# Patient Record
Sex: FEMALE | Race: OTHER | HISPANIC OR LATINO | URBAN - METROPOLITAN AREA
[De-identification: names, ages, dates, MRNs, and addresses within clinical notes are randomized per-mention and may not be internally consistent; named-entity substitution may affect disease eponyms.]

---

## 2022-04-01 ENCOUNTER — EMERGENCY (EMERGENCY)
Facility: HOSPITAL | Age: 72
LOS: 1 days | Discharge: HOME | End: 2022-04-01
Attending: EMERGENCY MEDICINE | Admitting: EMERGENCY MEDICINE
Payer: MEDICARE

## 2022-04-01 VITALS
DIASTOLIC BLOOD PRESSURE: 77 MMHG | OXYGEN SATURATION: 97 % | SYSTOLIC BLOOD PRESSURE: 144 MMHG | TEMPERATURE: 98 F | HEART RATE: 87 BPM | WEIGHT: 212.08 LBS | RESPIRATION RATE: 18 BRPM

## 2022-04-01 VITALS
RESPIRATION RATE: 17 BRPM | HEART RATE: 88 BPM | DIASTOLIC BLOOD PRESSURE: 81 MMHG | OXYGEN SATURATION: 98 % | SYSTOLIC BLOOD PRESSURE: 140 MMHG

## 2022-04-01 DIAGNOSIS — E78.5 HYPERLIPIDEMIA, UNSPECIFIED: ICD-10-CM

## 2022-04-01 DIAGNOSIS — Z91.013 ALLERGY TO SEAFOOD: ICD-10-CM

## 2022-04-01 DIAGNOSIS — R51.9 HEADACHE, UNSPECIFIED: ICD-10-CM

## 2022-04-01 DIAGNOSIS — J45.909 UNSPECIFIED ASTHMA, UNCOMPLICATED: ICD-10-CM

## 2022-04-01 DIAGNOSIS — I10 ESSENTIAL (PRIMARY) HYPERTENSION: ICD-10-CM

## 2022-04-01 LAB
APPEARANCE UR: CLEAR — SIGNIFICANT CHANGE UP
BILIRUB UR-MCNC: NEGATIVE — SIGNIFICANT CHANGE UP
COLOR SPEC: YELLOW — SIGNIFICANT CHANGE UP
DIFF PNL FLD: NEGATIVE — SIGNIFICANT CHANGE UP
GLUCOSE UR QL: NEGATIVE — SIGNIFICANT CHANGE UP
KETONES UR-MCNC: NEGATIVE — SIGNIFICANT CHANGE UP
LEUKOCYTE ESTERASE UR-ACNC: NEGATIVE — SIGNIFICANT CHANGE UP
NITRITE UR-MCNC: NEGATIVE — SIGNIFICANT CHANGE UP
PH UR: 6 — SIGNIFICANT CHANGE UP (ref 5–8)
PROT UR-MCNC: NEGATIVE MG/DL — SIGNIFICANT CHANGE UP
SP GR SPEC: 1.02 — SIGNIFICANT CHANGE UP (ref 1–1.03)
UROBILINOGEN FLD QL: 0.2 E.U./DL — SIGNIFICANT CHANGE UP

## 2022-04-01 PROCEDURE — 81003 URINALYSIS AUTO W/O SCOPE: CPT

## 2022-04-01 PROCEDURE — 70450 CT HEAD/BRAIN W/O DYE: CPT | Mod: 26,MA

## 2022-04-01 PROCEDURE — 70450 CT HEAD/BRAIN W/O DYE: CPT | Mod: MA

## 2022-04-01 PROCEDURE — 93010 ELECTROCARDIOGRAM REPORT: CPT

## 2022-04-01 PROCEDURE — 99284 EMERGENCY DEPT VISIT MOD MDM: CPT | Mod: 25

## 2022-04-01 PROCEDURE — 93005 ELECTROCARDIOGRAM TRACING: CPT

## 2022-04-01 NOTE — ED PROVIDER NOTE - PHYSICAL EXAMINATION
GEN: Well appearing, obese, well developed, awake, alert, oriented to person, place, time/situation and in no apparent distress. NTAF  ENT: Airway patent, Nasal mucosa clear. Mouth with normal mucosa.  EYES: Clear bilaterally. PERRL, EOMI  RESPIRATORY: Breathing comfortably with normal RR. No W/C/R, no hypoxia or resp distress.  CARDIAC: Regular rate and rhythm, no M/R/G  ABDOMEN: Soft, nontender, +bowel sounds, no rebound, rigidity, or guarding.  MSK: Range of motion is not limited, no deformities noted.  NEURO: Alert and oriented x 3. Cn 2-12 intact. Strength 5/5 and sensation intact in all 4 extremities. Gait normal.   SKIN: Skin normal color for race, warm, dry and intact. No evidence of rash.  PSYCH: Alert and oriented to person, place, time/situation. normal mood and affect. no apparent risk to self or others.

## 2022-04-01 NOTE — ED ADULT NURSE NOTE - NSIMPLEMENTINTERV_GEN_ALL_ED
Implemented All Universal Safety Interventions:  [place] to call system. Call bell, personal items and telephone within reach. Instruct patient to call for assistance. Room bathroom lighting operational. Non-slip footwear when patient is off stretcher. Physically safe environment: no spills, clutter or unnecessary equipment. Stretcher in lowest position, wheels locked, appropriate side rails in place.

## 2022-04-01 NOTE — ED PROVIDER NOTE - TOBACCO USE
What Is The Reason For Today's Visit?: Full Body Skin Examination What Is The Reason For Today's Visit? (Being Monitored For X): the risk of recurrence of previously treated lesion(s) Unknown if ever smoked

## 2022-04-01 NOTE — ED PROVIDER NOTE - CLINICAL SUMMARY MEDICAL DECISION MAKING FREE TEXT BOX
72F with a h/o HTN, HLD, asthma, overactive bladder who p/w headache and elevated BP. Pt states he BP is normal 120s systolic, but last night she had a pain in the back of her head (which she usually gets when her BP is high) and when she checked her BP it was 158/107. She has been compliant with her medications (amlodipine, losartan), but she admits to eating a lot of salt lately and to eating a diet high in salt, red meat and fried foods typically. SHe took a tylenol PTA and states HA is better. She denies cp/sob, n/v, dizziness, syncope, n/t/w in ext or other complaints at this time. 72F with a h/o HTN, HLD, asthma, overactive bladder who p/w headache and elevated BP. Pt states he BP is normal 120s systolic, but last night she had a pain in the back of her head (which she usually gets when her BP is high) and when she checked her BP it was 158/107. She has been compliant with her medications (amlodipine, losartan), but she admits to eating a lot of salt lately and to eating a diet high in salt, red meat and fried foods typically. SHe took a tylenol PTA and states HA is better. She denies cp/sob, n/v, dizziness, syncope, n/t/w in ext or other complaints at this time.    Pt is well-appearing on exam, VSS, no focal neuro deficits, EKG NSR, no ischemia   CT head neg for ICH, chronic changes noted. UA no infection. Pt stable for DC with dietary instructions and continued outpt PMD follow up.   Pt feeling improved and is stable for DC. ED evaluation and management discussed with the patient in detail.  Close PMD follow up encouraged.  Strict ED return instructions discussed in detail and patient given the opportunity to ask any questions about their discharge diagnosis and instructions. Patient verbalized understanding.

## 2022-04-01 NOTE — ED PROVIDER NOTE - NSFOLLOWUPINSTRUCTIONS_ED_ALL_ED_FT
Please follow up with your primary care physician. You may call our referrals coordinator at [phone] Monday to Friday 11am-7pm for assistance with making an appointment.  Return to the Emergency Department if you have any new or worsening symptoms, or for any other concerns. Please read below for further information.    Hypertension    Hypertension, commonly called high blood pressure, is when the force of blood pumping through your arteries is too strong. Hypertension forces your heart to work harder to pump blood. Your arteries may become narrow or stiff. Having untreated or uncontrolled hypertension for a long period of time can cause heart attack, stroke, kidney disease, and other problems. If started on a medication, take exactly as prescribed by your health care professional. Maintain a healthy lifestyle and follow up with your primary care physician.    SEEK IMMEDIATE MEDICAL CARE IF YOU HAVE ANY OF THE FOLLOWING SYMPTOMS: severe headache, confusion, chest pain, abdominal pain, vomiting, or shortness of breath.      Plan de alimentación con bajo contenido de sodio    Low-Sodium Eating Plan      El sodio, que es deepika de los elementos que constituyen la sal, ayuda a mantener un equilibrio saludable de los fluidos corporales. Mucha cantidad de sodio puede aumentar la presión arterial y hacer que el organismo retenga líquidos y residuos.    El médico o el nutricionista podrían recomendarle que siga terri plan si tiene presión arterial kelsie (hipertensión), enfermedad renal, enfermedad hepática o insuficiencia cardíaca. El consumo de graciela andres cantidad de sodio puede ayudar a disminuir la presión arterial, reducir la hinchazón y proteger el corazón, el hígado y los riñones.      Consejos para seguir terri plan    Leer las etiquetas de los alimentos     •La etiqueta de información nutricional indica la cantidad de sodio en graciela porción de alimento. Si come más de graciela porción, debe multiplicar la cantidad indicada de sodio por la cantidad de porciones.      •Elija alimentos con menos de 140 mg de sodio por porción.      •Evite consumir alimentos que tengan 300 mg de sodio o más por porción.        Al ir de compras      •Busque productos con bajo contenido de sodio, en cuyas etiquetas suele indicarse “bajo contenido de sodio” o “sin agregado de sal”.      •Siempre controle el contenido de sodio, incluso si en la etiqueta de los alimentos dice “sin sal” o “sin agregado de sal”.    •Compre alimentos frescos.   •Evite los alimentos enlatados y comidas precocidas o congeladas.      •Evite las juan enlatadas, curadas o procesadas.        •Compre panes que tengan menos de 80 mg de sodio por rebanada.        Al cocinar      •Consuma más comida casera y menos de restaurante, de bares y comida rápida.      •Evite agregar sal cuando cocine. Use hierbas o aderezos sin sal, en lugar de sal de hauser o sal raji. Consulte al médico o farmacéutico antes de usar sustitutos de la sal.      •Cocine con aceites de origen vegetal, zamzam el de canola, girasol u ramírez.      Planificación de las comidas     •Cuando coma en un restaurante, pida que preparen pa comida con menos sal o, en lo posible, sin nada de sal. Evite los platos etiquetados zamzam en salmuera, encurtidos, curados, ahumados o hechos con salsa de soja, miso o salsa teriyaki.      •Evite consumir alimentos que contengan GMS (glutamato monosódico). El GMS suele agregarse a la comida china, al consomé y a algunos alimentos enlatados.      •Prepare comidas que puedan ser grilladas, horneadas, hervidas, asadas o al vapor. Generalmente, se elaboran con menos sodio.      Información general     La mayoría de las personas que sigan terri plan deben limitar la ingesta de sodio a entre 1500 y 2000 mg (miligramos) por día.      ¿Qué alimentos inga comer?    Frutas     Frutas frescas, congeladas o enlatadas. Jugo de frutas.    Verduras     Verduras frescas o congeladas. Verduras enlatadas “sin agregado de margot”. Salsa de tomate y pastas “sin agregado de sal”. Jugos de tomate y verduras con bajo contenido de sodio o reducidos en sodio.    Granos     Cereales con bajo contenido de sodio, zamzam kalie, arroz y maria c inflados, y maria c triturado. Galletas con bajo contenido de sodio. Arroz sin sal. Pastas sin sal. Pan con bajo contenido de sodio. Panes y pastas integrales.    Juan y otras proteínas     Carne de juan o ave, pescado y mariscos frescos o congelados (sin agregado de sal). Atún y salmón enlatado con bajo contenido de sodio. Kenji secos sin sal. Lentejas, frijoles y guisantes secos, sin agregado de margot. Frijoles enlatados sin margot. Huevos. Mantequillas de kenji secos sin sal.    Lácteos     Leche. Leche de soja. Quesos que, por naturaleza, tengan bajo contenido de sodio, por ejemplo, la ricota, la mozzarella fresca o el queso suizo. Quesos con contenido bajo o reducido de sodio. Queso crema. Yogur.    Aliños y condimentos     Hierbas y especias frescas y secas. Aderezos sin sal. Mostaza y kétchup con bajo contenido de sodio. Aderezos para ensalada sin sodio. Mayonesa light sin sodio. Rábano picante fresco o refrigerado. Jugo de ji. Vinagre.    Otros alimentos     Sopas caseras o con contenido de sodio bajo o reducido. Palomitas de maíz y pretzels sin sal. Summer fritas sin sal o con bajo contenido de sal.    Es posible que los productos que se enumeran más arriba no constituyan graciela lista completa de los alimentos y las bebidas que puede lisa. Consulte a un nutricionista para obtener más información.       ¿Qué alimentos inga evitar?    Verduras     Chucrut, verduras en escabeche y salsas. [place]. Summer fritas. Anillos de cebollas. Verduras enlatadas regulares (que no manda con bajo contenido de sodio o reducidas en sodio). Pasta y salsa de tomates enlatadas regulares (que no manda con bajo contenido de sodio o reducidas en sodio). Jugos de tomate y verduras regulares (que no manda con bajo contenido de sodio o reducidos en sodio). Verduras congeladas en salsa.    Granos     Cereales instantáneos para comer calientes. Mezclas para bizcochos, panqueques y rellenos de kamara. Crutones. Mezclas para pastas o arroz con condimento. Envases comerciales de sopa de fideos. Macarrones con queso envasados o congelados. Galletas saladas comunes. Harina leudante.    Juan y otras proteínas     Carne de juan o pescado que esté salada, enlatada, ahumada, condimentada o en escabeche. Carne precocinada o curada, zamzam embutidos o panes de carne. Tocino. Jamón. Pepperoni. Perros calientes. Carne en conserva. Carne picada de buey. Cerdo francisco. Cecina o charqui. Arenque en escabeche. Anchoas y gordon. Atún enlatado común. Kenji secos con sal.    Lácteos     Quesos para untar y quesos procesados. Quesos duros. Requesón. Queso tremayne. Queso feta. Queso en hebras. Queso cottage común. Kailee de leche. Leche enlatada.    Grasas y aceites     Mantequilla con margot. Margarina común. Mantequilla clarificada. Grasa de panceta.    Aliños y condimentos     Sal de cebolla, sal de ajo, sal condimentada, sal de hauser y sal raji. Salsas en linda y envasadas. Salsa Worcester[place]. Salsa tártara. Salsa barbacoa. Salsa teriyaki. Salsa de soja, incluso la que tiene contenido reducido de sodio. Salsa de carne. Salsa de pescado. Salsa de ostras. Salsa rosada. Rábano picante envasado. Kétchup y mostaza comunes. Saborizantes y tiernizantes para carne. Caldo en cubitos. Salsa picante. Adobos preelaborados o envasados. Aderezos para tacos preelaborados o envasados. Salsas. Aderezos comunes para ensalada. Salsa.    Otros alimentos     Palomitas de maíz y pretzels con sal. Maíz inflado y frituras de maíz. Nachos y summer fritas envasadas. Sopas enlatadas o en polvo. Pizza. Pasteles y entradas congeladas.    Es posible que los productos que se enumeran más arriba no constituyan graciela lista completa de los alimentos y las bebidas que debe evitar. Consulte a un nutricionista para obtener más información.       Resumen    •El consumo de graciela andres cantidad de sodio puede ayudar a disminuir la presión arterial, reducir la hinchazón y proteger el corazón, el hígado y los riñones.      •La mayoría de las personas que sigan terri plan deben limitar la ingesta de sodio a entre 1500 y 2000 mg (miligramos) por día.      •Los alimentos enlatados, envasados y congelados tienen alto contenido de sodio. La pizza, la comida rápida y la comida de los restaurantes también contienen mucho sodio. También aporta sodio al agregar sal a las comidas.      •Intente cocinar en pa hogar, coma más frutas y verduras frescas, y coma menos comidas rápidas y alimentos enlatados, procesados o preparados.      Esta información no tiene zamzam fin reemplazar el consejo del médico. Asegúrese de hacerle al médico cualquier pregunta que tenga.

## 2022-04-01 NOTE — ED PROVIDER NOTE - PATIENT PORTAL LINK FT
You can access the FollowMyHealth Patient Portal offered by [facility] by registering at the following website: [email or web address]. By joining [employer]’s FollowMyHealth portal, you will also be able to view your health information using other applications (apps) compatible with our system.

## 2022-04-01 NOTE — ED PROVIDER NOTE - OBJECTIVE STATEMENT
72F with a h/o HTN, HLD, asthma, overactive bladder who p/w headache and elevated BP. Pt states he BP is normal 120s systolic, but last night she had a pain in the back of her head (which she usually gets when her BP is high) and when she checked her BP it was 158/107. She has been compliant with her medications (amlodipine, losartan), but she admits to eating a lot of salt lately and to eating a diet high in salt, red meat and fried foods typically. SHe took a tylenol PTA and states HA is better. She denies cp/sob, n/v, dizziness, syncope, n/t/w in ext or other complaints at this time.

## 2022-04-01 NOTE — ED ADULT TRIAGE NOTE - CHIEF COMPLAINT QUOTE
Pt presents to ED c/o high blood pressure. hx of HTN, takes amlodipine and losartan. reports this morning home BP was 155/106. Reporting headache and back pain. denies cp, dizziness. 12 lead EKG done.

## 2024-10-02 ENCOUNTER — EMERGENCY (EMERGENCY)
Facility: HOSPITAL | Age: 74
LOS: 1 days | Discharge: HOME | End: 2024-10-02
Attending: EMERGENCY MEDICINE | Admitting: EMERGENCY MEDICINE
Payer: MEDICARE

## 2024-10-02 VITALS
DIASTOLIC BLOOD PRESSURE: 97 MMHG | SYSTOLIC BLOOD PRESSURE: 173 MMHG | TEMPERATURE: 98 F | HEART RATE: 69 BPM | RESPIRATION RATE: 18 BRPM | OXYGEN SATURATION: 99 %

## 2024-10-02 VITALS
RESPIRATION RATE: 17 BRPM | OXYGEN SATURATION: 97 % | DIASTOLIC BLOOD PRESSURE: 80 MMHG | TEMPERATURE: 98 F | HEART RATE: 80 BPM | SYSTOLIC BLOOD PRESSURE: 119 MMHG

## 2024-10-02 DIAGNOSIS — J45.909 UNSPECIFIED ASTHMA, UNCOMPLICATED: ICD-10-CM

## 2024-10-02 DIAGNOSIS — I10 ESSENTIAL (PRIMARY) HYPERTENSION: ICD-10-CM

## 2024-10-02 DIAGNOSIS — E78.5 HYPERLIPIDEMIA, UNSPECIFIED: ICD-10-CM

## 2024-10-02 DIAGNOSIS — R10.30 LOWER ABDOMINAL PAIN, UNSPECIFIED: ICD-10-CM

## 2024-10-02 DIAGNOSIS — K57.92 DIVERTICULITIS OF INTESTINE, PART UNSPECIFIED, WITHOUT PERFORATION OR ABSCESS WITHOUT BLEEDING: ICD-10-CM

## 2024-10-02 LAB
ALBUMIN SERPL ELPH-MCNC: 4.5 G/DL — SIGNIFICANT CHANGE UP (ref 3.3–5)
ALP SERPL-CCNC: SIGNIFICANT CHANGE UP (ref 40–120)
ALT FLD-CCNC: SIGNIFICANT CHANGE UP (ref 10–45)
ANION GAP SERPL CALC-SCNC: 12 MMOL/L — SIGNIFICANT CHANGE UP (ref 5–17)
APPEARANCE UR: CLEAR — SIGNIFICANT CHANGE UP
AST SERPL-CCNC: SIGNIFICANT CHANGE UP (ref 10–40)
BACTERIA # UR AUTO: ABNORMAL /HPF
BASOPHILS # BLD AUTO: 0.06 K/UL — SIGNIFICANT CHANGE UP (ref 0–0.2)
BASOPHILS NFR BLD AUTO: 0.5 % — SIGNIFICANT CHANGE UP (ref 0–2)
BILIRUB SERPL-MCNC: 0.3 MG/DL — SIGNIFICANT CHANGE UP (ref 0.2–1.2)
BILIRUB UR-MCNC: NEGATIVE — SIGNIFICANT CHANGE UP
BUN SERPL-MCNC: 18 MG/DL — SIGNIFICANT CHANGE UP (ref 7–23)
CALCIUM SERPL-MCNC: 9.8 MG/DL — SIGNIFICANT CHANGE UP (ref 8.4–10.5)
CAST: 0 /LPF — SIGNIFICANT CHANGE UP (ref 0–4)
CHLORIDE SERPL-SCNC: 99 MMOL/L — SIGNIFICANT CHANGE UP (ref 96–108)
CO2 SERPL-SCNC: 28 MMOL/L — SIGNIFICANT CHANGE UP (ref 22–31)
COLOR SPEC: YELLOW — SIGNIFICANT CHANGE UP
CREAT SERPL-MCNC: 0.88 MG/DL — SIGNIFICANT CHANGE UP (ref 0.5–1.3)
DIFF PNL FLD: NEGATIVE — SIGNIFICANT CHANGE UP
EGFR: 68 ML/MIN/1.73M2 — SIGNIFICANT CHANGE UP
EOSINOPHIL # BLD AUTO: 0.06 K/UL — SIGNIFICANT CHANGE UP (ref 0–0.5)
EOSINOPHIL NFR BLD AUTO: 0.5 % — SIGNIFICANT CHANGE UP (ref 0–6)
GLUCOSE SERPL-MCNC: 109 MG/DL — HIGH (ref 70–99)
GLUCOSE UR QL: NEGATIVE MG/DL — SIGNIFICANT CHANGE UP
HCT VFR BLD CALC: 45.8 % — HIGH (ref 34.5–45)
HGB BLD-MCNC: 13.8 G/DL — SIGNIFICANT CHANGE UP (ref 11.5–15.5)
IMM GRANULOCYTES NFR BLD AUTO: 0.3 % — SIGNIFICANT CHANGE UP (ref 0–0.9)
KETONES UR-MCNC: NEGATIVE MG/DL — SIGNIFICANT CHANGE UP
LACTATE SERPL-SCNC: 1.4 MMOL/L — SIGNIFICANT CHANGE UP (ref 0.5–2)
LEUKOCYTE ESTERASE UR-ACNC: NEGATIVE — SIGNIFICANT CHANGE UP
LIDOCAIN IGE QN: 32 U/L — SIGNIFICANT CHANGE UP (ref 7–60)
LYMPHOCYTES # BLD AUTO: 1.31 K/UL — SIGNIFICANT CHANGE UP (ref 1–3.3)
LYMPHOCYTES # BLD AUTO: 11.2 % — LOW (ref 13–44)
MCHC RBC-ENTMCNC: 24.8 PG — LOW (ref 27–34)
MCHC RBC-ENTMCNC: 30.1 GM/DL — LOW (ref 32–36)
MCV RBC AUTO: 82.4 FL — SIGNIFICANT CHANGE UP (ref 80–100)
MONOCYTES # BLD AUTO: 0.38 K/UL — SIGNIFICANT CHANGE UP (ref 0–0.9)
MONOCYTES NFR BLD AUTO: 3.2 % — SIGNIFICANT CHANGE UP (ref 2–14)
NEUTROPHILS # BLD AUTO: 9.88 K/UL — HIGH (ref 1.8–7.4)
NEUTROPHILS NFR BLD AUTO: 84.3 % — HIGH (ref 43–77)
NITRITE UR-MCNC: NEGATIVE — SIGNIFICANT CHANGE UP
NRBC # BLD: 0 /100 WBCS — SIGNIFICANT CHANGE UP (ref 0–0)
PH UR: 6 — SIGNIFICANT CHANGE UP (ref 5–8)
PLATELET # BLD AUTO: 288 K/UL — SIGNIFICANT CHANGE UP (ref 150–400)
POTASSIUM SERPL-MCNC: SIGNIFICANT CHANGE UP (ref 3.5–5.3)
POTASSIUM SERPL-SCNC: SIGNIFICANT CHANGE UP (ref 3.5–5.3)
PROT SERPL-MCNC: 8.8 G/DL — HIGH (ref 6–8.3)
PROT UR-MCNC: 30 MG/DL
RBC # BLD: 5.56 M/UL — HIGH (ref 3.8–5.2)
RBC # FLD: 16 % — HIGH (ref 10.3–14.5)
RBC CASTS # UR COMP ASSIST: 3 /HPF — SIGNIFICANT CHANGE UP (ref 0–4)
SODIUM SERPL-SCNC: 139 MMOL/L — SIGNIFICANT CHANGE UP (ref 135–145)
SP GR SPEC: 1.02 — SIGNIFICANT CHANGE UP (ref 1–1.03)
SQUAMOUS # UR AUTO: 13 /HPF — HIGH (ref 0–5)
UROBILINOGEN FLD QL: 1 MG/DL — SIGNIFICANT CHANGE UP (ref 0.2–1)
WBC # BLD: 11.73 K/UL — HIGH (ref 3.8–10.5)
WBC # FLD AUTO: 11.73 K/UL — HIGH (ref 3.8–10.5)
WBC UR QL: 4 /HPF — SIGNIFICANT CHANGE UP (ref 0–5)

## 2024-10-02 PROCEDURE — 83605 ASSAY OF LACTIC ACID: CPT

## 2024-10-02 PROCEDURE — 81001 URINALYSIS AUTO W/SCOPE: CPT

## 2024-10-02 PROCEDURE — 99285 EMERGENCY DEPT VISIT HI MDM: CPT

## 2024-10-02 PROCEDURE — 36415 COLL VENOUS BLD VENIPUNCTURE: CPT

## 2024-10-02 PROCEDURE — 85025 COMPLETE CBC W/AUTO DIFF WBC: CPT

## 2024-10-02 PROCEDURE — 99284 EMERGENCY DEPT VISIT MOD MDM: CPT | Mod: 25

## 2024-10-02 PROCEDURE — 74176 CT ABD & PELVIS W/O CONTRAST: CPT | Mod: MC

## 2024-10-02 PROCEDURE — 96374 THER/PROPH/DIAG INJ IV PUSH: CPT

## 2024-10-02 PROCEDURE — 83690 ASSAY OF LIPASE: CPT

## 2024-10-02 PROCEDURE — 74176 CT ABD & PELVIS W/O CONTRAST: CPT | Mod: 26,MC

## 2024-10-02 PROCEDURE — 80053 COMPREHEN METABOLIC PANEL: CPT

## 2024-10-02 RX ORDER — IOHEXOL 350 MG/ML
30 INJECTION, SOLUTION INTRAVENOUS ONCE
Refills: 0 | Status: COMPLETED | OUTPATIENT
Start: 2024-10-02 | End: 2024-10-02

## 2024-10-02 RX ORDER — ONDANSETRON 2 MG/ML
4 INJECTION, SOLUTION INTRAMUSCULAR; INTRAVENOUS ONCE
Refills: 0 | Status: DISCONTINUED | OUTPATIENT
Start: 2024-10-02 | End: 2024-10-02

## 2024-10-02 RX ORDER — AMOXICILLIN AND CLAVULANATE POTASSIUM 250; 125 MG/1; MG/1
875 TABLET, FILM COATED ORAL
Qty: 14 | Refills: 0
Start: 2024-10-02 | End: 2024-10-08

## 2024-10-02 RX ORDER — AMOXICILLIN AND CLAVULANATE POTASSIUM 250; 125 MG/1; MG/1
1 TABLET, FILM COATED ORAL ONCE
Refills: 0 | Status: COMPLETED | OUTPATIENT
Start: 2024-10-02 | End: 2024-10-02

## 2024-10-02 RX ORDER — ONDANSETRON 2 MG/ML
4 INJECTION, SOLUTION INTRAMUSCULAR; INTRAVENOUS ONCE
Refills: 0 | Status: COMPLETED | OUTPATIENT
Start: 2024-10-02 | End: 2024-10-02

## 2024-10-02 RX ADMIN — AMOXICILLIN AND CLAVULANATE POTASSIUM 1 TABLET(S): 250; 125 TABLET, FILM COATED ORAL at 15:07

## 2024-10-02 RX ADMIN — ONDANSETRON 4 MILLIGRAM(S): 2 INJECTION, SOLUTION INTRAMUSCULAR; INTRAVENOUS at 12:46

## 2024-10-02 RX ADMIN — Medication 4 MILLIGRAM(S): at 12:15

## 2024-10-02 RX ADMIN — Medication 4 MILLIGRAM(S): at 12:45

## 2024-10-02 RX ADMIN — IOHEXOL 30 MILLILITER(S): 350 INJECTION, SOLUTION INTRAVENOUS at 12:15

## 2024-10-02 NOTE — ED ADULT TRIAGE NOTE - CHIEF COMPLAINT QUOTE
Patient to the ED c/o lower abdominal pain since waking up today. Denies changes in bowel/bladder. Ambulatory, AAOX4, NAD.

## 2024-10-02 NOTE — ED PROVIDER NOTE - PATIENT PORTAL LINK FT
You can access the FollowMyHealth Patient Portal offered by [facility] by registering at the following website: [email or web address]. By joining [employer]’s FollowMyHealth portal, you will also be able to view your health information using other applications (apps) compatible with our system.

## 2024-10-02 NOTE — ED ADULT NURSE NOTE - OBJECTIVE STATEMENT
Pt A&Ox4 and able to speak in complete sentences. Pt breathing even and unlabored with equal chest rise and fall. Pt arrived d/t lower abd pain since the AM. Pt arrived denies cp, sob, fevers, chills, n, v, lightheadedness, and numbness. Pt hx of HTN.

## 2024-10-02 NOTE — ED PROVIDER NOTE - NSFOLLOWUPINSTRUCTIONS_ED_ALL_ED_FT
Take Augmentin as prescribed.  Follow up with your PMD.  Return to ED with worsening symptoms or other concerns.  Stay well and feel better.    Diverticulitis  Body outline showing the stomach and intestines, with a close-up of diverticula on the large intestine.  Diverticulitis happens when poop (stool) and bacteria get trapped in small pouches in the colon called diverticula. These pouches may form if you have a condition called diverticulosis. When the poop and bacteria get trapped, it can cause an infection and inflammation.    Diverticulitis may cause severe stomach pain and diarrhea. It can also lead to tissue damage in your colon. This can cause bleeding or blockage. In some cases, the diverticula may burst (rupture). This can cause infected poop to go into other parts of your abdomen.    What are the causes?  This condition is caused by poop getting trapped in the diverticula. This allows bacteria to grow. It can lead to inflammation and infection.    What increases the risk?  You are more likely to get this condition if you have diverticulosis. You are also more at risk if:  You are overweight or obese.  You do not get enough exercise.  You drink alcohol.  You smoke.  You eat a lot of red meat, such as beef, pork, or lamb.  You do not get enough fiber. Foods high in fiber include fruits, vegetables, beans, nuts, and whole grains.  You are over 40 years of age.  What are the signs or symptoms?  Symptoms of this condition may include:  Pain and tenderness in the abdomen. This pain is often felt on the left side but may occur in other spots.  Fever and chills.  Nausea and vomiting.  Cramping.  Bloating.  Changes in how often you poop.  Blood in your poop.  How is this diagnosed?  This condition is diagnosed based on your medical history and a physical exam. You may also have tests done to make sure there is nothing else causing your condition. These tests may include:  Blood tests.  Tests done on your pee (urine).  A CT scan of the abdomen.  You may need to have a colonoscopy. This is an exam to look at your whole large intestine. During the exam, a tube is put into the opening of your butt (anus) and then moved into your rectum, colon, and other parts of the large intestine.    This exam is done to look at the diverticula. It can also see if there is something else that may be causing your symptoms.    How is this treated?  Most cases are mild and can be treated at home. You may be told to:  Take over-the-counter pain medicine.  Only eat and drink clear liquids.  Take antibiotics.  Rest.  More severe cases may need to be treated at a hospital. Treatment may include:  Not eating or drinking.  Taking pain medicines.  Getting antibiotics through an IV.  Getting fluids and nutrition through an IV.  Surgery.  Follow these instructions at home:  Medicines    Take over-the-counter and prescription medicines only as told by your health care provider. These include fiber supplements, probiotics, and medicines to soften your poop (stool softeners).  If you were prescribed antibiotics, take them as told by your provider. Do not stop using the antibiotic even if you start to feel better.  Ask your provider if the medicine prescribed to you requires you to avoid driving or using machinery.  Eating and drinking    Pear, berries, artichoke, and beans.  Follow the diet told by your provider. You may need to only eat and drink liquids.  After your symptoms get better, you may be able to return to a more normal diet. You may be told to eat at least 25 grams (25 g) of fiber each day. Fiber makes it easier to poop. Healthy sources of fiber include:  Berries. One cup has 4–8 g of fiber.  Beans or lentils. One-half cup has 5–8 g of fiber.  Green vegetables. One cup has 4 g of fiber.  Avoid eating red meat.  General instructions    Do not use any products that contain nicotine or tobacco. These products include cigarettes, chewing tobacco, and vaping devices, such as e-cigarettes. If you need help quitting, ask your provider.  Exercise for at least 30 minutes, 3 times a week. Exercise hard enough to raise your heart rate and break a sweat.  Contact a health care provider if:  Your pain gets worse.  Your pooping does not go back to normal.  Your symptoms do not get better with treatment.  Your symptoms get worse all of a sudden.  You have a fever.  You vomit more than one time.  Your poop is bloody, black, or tarry.  This information is not intended to replace advice given to you by your health care provider. Make sure you discuss any questions you have with your health care provider.

## 2024-10-02 NOTE — ED ADULT NURSE NOTE - NSFALLUNIVINTERV_ED_ALL_ED
Bed/Stretcher in lowest position, wheels locked, appropriate side rails in place/Call bell, personal items and telephone in reach/Instruct patient to call for assistance before getting out of bed/chair/stretcher/Non-slip footwear applied when patient is off stretcher/[place] to call system/Physically safe environment - no spills, clutter or unnecessary equipment/Purposeful proactive rounding/Room/bathroom lighting operational, light cord in reach

## 2024-10-02 NOTE — ED PROVIDER NOTE - CLINICAL SUMMARY MEDICAL DECISION MAKING FREE TEXT BOX
75 y/o f with PMH of HTN, HLD, asthma, overactive bladder presents to ED with suprapubic abd pain x 1 day associated with nausea .  No dysuria, frequency, back pain, diarrhea.  No fever or chills.  No chest pain or shortness of breath.  Pt did not take meds prior to arrival.     VSS  PE suprapubic tenderness  Labs: unremarkable  Pt difficult IV - requesting oral contrast only and IM meds  CT pending 75 y/o f with PMH of HTN, HLD, asthma, overactive bladder presents to ED with suprapubic abd pain x 1 day associated with nausea .  No dysuria, frequency, back pain, diarrhea.  No fever or chills.  No chest pain or shortness of breath.  Pt did not take meds prior to arrival.     VSS  PE suprapubic tenderness  Labs: unremarkable  Pt difficult IV - requesting oral contrast only and IM meds  CT pending  CT with acute simple diverticulitis - Augmentin and dc

## 2024-10-02 NOTE — ED PROVIDER NOTE - OBJECTIVE STATEMENT
73 y/o f with PMH of HTN, HLD, asthma, overactive bladder presents to ED with suprapubic abd pain x 1 day associated with nausea .  No dysuria, frequency, back pain, diarrhea.  No fever or chills.  No chest pain or shortness of breath.  Pt did not take meds prior to arrival.

## 2024-10-02 NOTE — ED PROVIDER NOTE - PHYSICAL EXAMINATION
VITAL SIGNS: I have reviewed nursing notes and confirm.  CONSTITUTIONAL: Well-developed; well-nourished; in no acute distress.  SKIN: Agree with RN documentation regarding decubitus evaluation. Remainder of skin exam is warm and dry, no acute rash.  HEAD: Normocephalic; atraumatic.  EYES: PERRL, EOM intact; conjunctiva and sclera clear.  ENT: No nasal discharge; airway clear.  NECK: Supple; non tender.  CARD: S1, S2 normal; no murmurs, gallops, or rubs. Regular rate and rhythm.  RESP: No wheezes, rales or rhonchi.  ABD:+ suprapubic tenderness, no guarding, no rebound, no cva tenderness  EXT: Normal ROM. No clubbing, cyanosis or edema.  LYMPH: No acute cervical adenopathy.  NEURO: Alert, oriented. Grossly unremarkable.  PSYCH: Cooperative, appropriate.

## 2025-03-18 ENCOUNTER — EMERGENCY (EMERGENCY)
Facility: HOSPITAL | Age: 75
LOS: 1 days | Discharge: HOME | End: 2025-03-18
Attending: STUDENT IN AN ORGANIZED HEALTH CARE EDUCATION/TRAINING PROGRAM | Admitting: STUDENT IN AN ORGANIZED HEALTH CARE EDUCATION/TRAINING PROGRAM
Payer: MEDICARE

## 2025-03-18 VITALS
RESPIRATION RATE: 16 BRPM | HEART RATE: 68 BPM | SYSTOLIC BLOOD PRESSURE: 150 MMHG | OXYGEN SATURATION: 97 % | TEMPERATURE: 98 F | DIASTOLIC BLOOD PRESSURE: 88 MMHG

## 2025-03-18 VITALS
HEIGHT: 60 IN | RESPIRATION RATE: 18 BRPM | SYSTOLIC BLOOD PRESSURE: 160 MMHG | DIASTOLIC BLOOD PRESSURE: 88 MMHG | OXYGEN SATURATION: 97 % | TEMPERATURE: 97 F | WEIGHT: 167.99 LBS | HEART RATE: 70 BPM

## 2025-03-18 LAB
ALBUMIN SERPL ELPH-MCNC: 4.8 G/DL — SIGNIFICANT CHANGE UP (ref 3.3–5)
ALP SERPL-CCNC: 103 U/L — SIGNIFICANT CHANGE UP (ref 40–120)
ALT FLD-CCNC: 37 U/L — SIGNIFICANT CHANGE UP (ref 10–45)
ANION GAP SERPL CALC-SCNC: 12 MMOL/L — SIGNIFICANT CHANGE UP (ref 5–17)
APPEARANCE UR: CLEAR — SIGNIFICANT CHANGE UP
AST SERPL-CCNC: 26 U/L — SIGNIFICANT CHANGE UP (ref 10–40)
BILIRUB SERPL-MCNC: 0.2 MG/DL — SIGNIFICANT CHANGE UP (ref 0.2–1.2)
BILIRUB UR-MCNC: NEGATIVE — SIGNIFICANT CHANGE UP
BUN SERPL-MCNC: 16 MG/DL — SIGNIFICANT CHANGE UP (ref 7–23)
CALCIUM SERPL-MCNC: 10.6 MG/DL — HIGH (ref 8.4–10.5)
CHLORIDE SERPL-SCNC: 103 MMOL/L — SIGNIFICANT CHANGE UP (ref 96–108)
CO2 SERPL-SCNC: 28 MMOL/L — SIGNIFICANT CHANGE UP (ref 22–31)
COLOR SPEC: YELLOW — SIGNIFICANT CHANGE UP
CREAT SERPL-MCNC: 0.8 MG/DL — SIGNIFICANT CHANGE UP (ref 0.5–1.3)
DIFF PNL FLD: NEGATIVE — SIGNIFICANT CHANGE UP
EGFR: 76 ML/MIN/1.73M2 — SIGNIFICANT CHANGE UP
EGFR: 76 ML/MIN/1.73M2 — SIGNIFICANT CHANGE UP
GLUCOSE SERPL-MCNC: 90 MG/DL — SIGNIFICANT CHANGE UP (ref 70–99)
GLUCOSE UR QL: NEGATIVE MG/DL — SIGNIFICANT CHANGE UP
HCT VFR BLD CALC: 44.3 % — SIGNIFICANT CHANGE UP (ref 34.5–45)
HGB BLD-MCNC: 13.9 G/DL — SIGNIFICANT CHANGE UP (ref 11.5–15.5)
KETONES UR-MCNC: NEGATIVE MG/DL — SIGNIFICANT CHANGE UP
LEUKOCYTE ESTERASE UR-ACNC: NEGATIVE — SIGNIFICANT CHANGE UP
MAGNESIUM SERPL-MCNC: 2.1 MG/DL — SIGNIFICANT CHANGE UP (ref 1.6–2.6)
MCHC RBC-ENTMCNC: 26.5 PG — LOW (ref 27–34)
MCHC RBC-ENTMCNC: 31.4 G/DL — LOW (ref 32–36)
MCV RBC AUTO: 84.5 FL — SIGNIFICANT CHANGE UP (ref 80–100)
NITRITE UR-MCNC: NEGATIVE — SIGNIFICANT CHANGE UP
NRBC BLD AUTO-RTO: 0 /100 WBCS — SIGNIFICANT CHANGE UP (ref 0–0)
PH UR: 6 — SIGNIFICANT CHANGE UP (ref 5–8)
PLATELET # BLD AUTO: 271 K/UL — SIGNIFICANT CHANGE UP (ref 150–400)
POTASSIUM SERPL-MCNC: 4 MMOL/L — SIGNIFICANT CHANGE UP (ref 3.5–5.3)
POTASSIUM SERPL-SCNC: 4 MMOL/L — SIGNIFICANT CHANGE UP (ref 3.5–5.3)
PROT SERPL-MCNC: 8.3 G/DL — SIGNIFICANT CHANGE UP (ref 6–8.3)
PROT UR-MCNC: NEGATIVE MG/DL — SIGNIFICANT CHANGE UP
RBC # BLD: 5.24 M/UL — HIGH (ref 3.8–5.2)
RBC # FLD: 15.8 % — HIGH (ref 10.3–14.5)
SODIUM SERPL-SCNC: 143 MMOL/L — SIGNIFICANT CHANGE UP (ref 135–145)
SP GR SPEC: 1.01 — SIGNIFICANT CHANGE UP (ref 1–1.03)
UROBILINOGEN FLD QL: 0.2 MG/DL — SIGNIFICANT CHANGE UP (ref 0.2–1)
WBC # BLD: 9.01 K/UL — SIGNIFICANT CHANGE UP (ref 3.8–10.5)
WBC # FLD AUTO: 9.01 K/UL — SIGNIFICANT CHANGE UP (ref 3.8–10.5)

## 2025-03-18 PROCEDURE — 96374 THER/PROPH/DIAG INJ IV PUSH: CPT

## 2025-03-18 PROCEDURE — 70450 CT HEAD/BRAIN W/O DYE: CPT | Mod: MC

## 2025-03-18 PROCEDURE — 36415 COLL VENOUS BLD VENIPUNCTURE: CPT

## 2025-03-18 PROCEDURE — 72125 CT NECK SPINE W/O DYE: CPT | Mod: 26

## 2025-03-18 PROCEDURE — 99285 EMERGENCY DEPT VISIT HI MDM: CPT | Mod: 25

## 2025-03-18 PROCEDURE — 72131 CT LUMBAR SPINE W/O DYE: CPT | Mod: MC

## 2025-03-18 PROCEDURE — 72131 CT LUMBAR SPINE W/O DYE: CPT | Mod: 26

## 2025-03-18 PROCEDURE — 71045 X-RAY EXAM CHEST 1 VIEW: CPT | Mod: 26

## 2025-03-18 PROCEDURE — 85027 COMPLETE CBC AUTOMATED: CPT

## 2025-03-18 PROCEDURE — 93010 ELECTROCARDIOGRAM REPORT: CPT

## 2025-03-18 PROCEDURE — 99285 EMERGENCY DEPT VISIT HI MDM: CPT

## 2025-03-18 PROCEDURE — 72125 CT NECK SPINE W/O DYE: CPT | Mod: MC

## 2025-03-18 PROCEDURE — 83735 ASSAY OF MAGNESIUM: CPT

## 2025-03-18 PROCEDURE — 80053 COMPREHEN METABOLIC PANEL: CPT

## 2025-03-18 PROCEDURE — 71045 X-RAY EXAM CHEST 1 VIEW: CPT

## 2025-03-18 PROCEDURE — 70450 CT HEAD/BRAIN W/O DYE: CPT | Mod: 26

## 2025-03-18 PROCEDURE — 81003 URINALYSIS AUTO W/O SCOPE: CPT

## 2025-03-18 PROCEDURE — 93005 ELECTROCARDIOGRAM TRACING: CPT

## 2025-03-18 PROCEDURE — T1013: CPT

## 2025-03-18 RX ORDER — ACETAMINOPHEN 500 MG/5ML
650 LIQUID (ML) ORAL ONCE
Refills: 0 | Status: COMPLETED | OUTPATIENT
Start: 2025-03-18 | End: 2025-03-18

## 2025-03-18 RX ORDER — KETOROLAC TROMETHAMINE 30 MG/ML
15 INJECTION, SOLUTION INTRAMUSCULAR; INTRAVENOUS ONCE
Refills: 0 | Status: DISCONTINUED | OUTPATIENT
Start: 2025-03-18 | End: 2025-03-18

## 2025-03-18 RX ORDER — METOCLOPRAMIDE HCL 10 MG
10 TABLET ORAL ONCE
Refills: 0 | Status: COMPLETED | OUTPATIENT
Start: 2025-03-18 | End: 2025-03-18

## 2025-03-18 RX ADMIN — Medication 10 MILLIGRAM(S): at 14:51

## 2025-03-18 RX ADMIN — Medication 650 MILLIGRAM(S): at 14:50

## 2025-03-18 RX ADMIN — KETOROLAC TROMETHAMINE 15 MILLIGRAM(S): 30 INJECTION, SOLUTION INTRAMUSCULAR; INTRAVENOUS at 15:25

## 2025-03-18 RX ADMIN — Medication 1000 MILLILITER(S): at 15:35

## 2025-03-18 RX ADMIN — KETOROLAC TROMETHAMINE 15 MILLIGRAM(S): 30 INJECTION, SOLUTION INTRAMUSCULAR; INTRAVENOUS at 15:55

## 2025-03-18 RX ADMIN — Medication 650 MILLIGRAM(S): at 15:20

## 2025-03-18 NOTE — ED PROVIDER NOTE - PATIENT PORTAL LINK FT
You can access the FollowMyHealth Patient Portal offered by [facility] by registering at the following website: [email or web address]. By joining [employer]’s FollowMyHealth portal, you will also be able to view your health information using other applications (apps) compatible with our system.

## 2025-03-18 NOTE — ED ADULT NURSE NOTE - SINCE THE ILLNESS OR INJURY
no ROM deficits were identified Yes unable to assess due to pt refusal; moves all limbs against gravity

## 2025-03-18 NOTE — ED PROVIDER NOTE - NSFOLLOWUPINSTRUCTIONS_ED_ALL_ED_FT
Call [phone] to make an appointment with a spine surgeon.     Please reach out to Clark Joyner ([facility] ED clinical referral coordinator) to assist you with your follow-up appointment with a spine surgeon and cardiologist.    Monday - Friday 9am-5pm  [phone]  [email or web address]     1. You were seen for fainting. A copy of any of your resulted labs, imaging, and findings have been provided to you. Make sure to view any test results that may not have yet resulted at the time of your discharge by creating a FollowMyHealth account at: [email or web address] to sign up for FollowMyHealth. Please review all of your lab, imaging, and all other results in their entirety with your primary care doctor.   2. Continue to take your home medications as prescribed.   3. Follow up with a spine surgeon and cardiologist and your primary care doctor within 48 hours to assess the symptoms you were seen for in the emergency department and to review all results from your visit. If you don't have a doctor, call [phone] to make an appointment.  4. Return immediately to the emergency department for new, persistent, or worsening symptoms or signs. Return immediately to the emergency department if you have chest pain, shortness of breath, loss of consciousness, fainting, difficulty controlling your bowel or bladder, numbness, weakness, chest pain, lightheadedness, or fever.   5. For your for health, you should make healthy food choices and be physically active. Also, you should not smoke or use drugs, and you should not drink alcohol in excess. Please visit [email or web address] for resources and more information.

## 2025-03-18 NOTE — ED PROVIDER NOTE - PHYSICAL EXAMINATION
Constitutional: awake and alert, in no acute distress  HEENT: head normocephalic and atraumatic. moist mucous membranes  Eyes: extraocular movements intact, normal conjunctiva  Neck: supple, normal ROM  Cardiovascular: regular rate   Pulmonary: no respiratory distress  Gastrointestinal: abdomen flat and nondistended  Skin: warm, dry, normal for ethnicity  Musculoskeletal: no edema, no deformity  Neurological: oriented x4, no focal neurologic deficit.   Psychiatric: calm and cooperative    Lungs CTAB, no wheezing

## 2025-03-18 NOTE — ED PROVIDER NOTE - OBJECTIVE STATEMENT
75F with hx asthma and HTN presents with headache. Patient does NOT have shortness of breath at this time, reports mild shortness of breath yesterday that spontaneously resolved. Patient's primary symptoms is six days of occipital headache that began gradually and worsening. She fell with head strike six days ago and had a return flight to [place] from [place], where she required oxygen for symptoms mid-flight and had partial resolution of headache at that time. Did not seek medical attention after the flight. No vision changes, no loss of balance, no focal weakness or other neurologic deficit. ROS otherwise negative. Kiswahili speaking only,  used. Patient requesting to eat.

## 2025-03-18 NOTE — ED ADULT NURSE NOTE - NSICDXPASTMEDICALHX_GEN_ALL_CORE_FT
PAST MEDICAL HISTORY:  Asthma Moderate persistent asthma    Essential hypertension HTN (hypertension)

## 2025-03-18 NOTE — ED ADULT NURSE NOTE - OBJECTIVE STATEMENT
75 y.o. Female presents to ED c/o headache x1 week, shortness of breath yesterday that resolved, and a fall last week with a head strike. Denies AC use, LOC, cp, current sob, abd pain, nvd, f/c.

## 2025-03-18 NOTE — ED PROVIDER NOTE - PROGRESS NOTE DETAILS
MD Feliz:   - received signout from prior attending 75F with hx asthma and HTN presents with headache. Patient does NOT have shortness of breath at this time, reports mild shortness of breath yesterday that spontaneously resolved. Patient's primary symptoms is six days of occipital headache that began gradually and worsening.    - labs: cbc unremarkable, cmp unremarkable. ua wnl. pt signed out pending CTH and C spine read  - ct: CT HEAD:  No acute intracranial hemorrhage, mass effect, or midline shift.    CT CERVICAL SPINE:  No acute fracture or traumatic subluxation.    Multi-level degenerative changes.  -  ID: [id number]  - on reassessment pt appears awake alert nad, states she has had some back pain and urinary incontinence x 2 months (urine just comes out right away not because she can't make it to the bathroom on time). denies trauma. states she feels numb and weak in her legs. on exam no midline c/t/l spinal ttp or stepoffs, BLE: 5/5 motor strength bilat hip flexors, knee flexors and extensors, plantar and dorsiflexors, hallux flexors and extensors. sensation intact to light touch bilat L3-S1;  compartment soft, skin warm and dry BLE neuromuscular intact. urinary incontinence does not appear to be due to due uti. pt states when she fell a few days ago she doesn't remember what happened. did not have preceding dizziness/cp/sob. her friend found her on the floor. +loc. ekg nsr no soledad/std/twi. pt ambulatory. since neuromuscular intact and no midline ttp no exam findings concerning for cord compression lumbar ct unremarkable will dc with outpatient cardiology and spine f/u

## 2025-03-18 NOTE — ED PROVIDER NOTE - CLINICAL SUMMARY MEDICAL DECISION MAKING FREE TEXT BOX
Given no chest pain or shortness of breath, no concern for emergent cardiac or pulmonary pathology. Headache is most likely concussion from fall, but will obtain CT head to rule out traumatic bleed. Will check basic labs to r/o electrolyte or metabolic abnormality. FLuids, reglan, tylenol, toradol for symptms control, will reassess. Given no chest pain or shortness of breath, no concern for emergent cardiac or pulmonary pathology. Headache is most likely concussion from fall, but will obtain CT head to rule out traumatic bleed. Will check basic labs to r/o electrolyte or metabolic abnormality. Fluids, reglan, tylenol, toradol for symptom control, will reassess.

## 2025-03-18 NOTE — ED ADULT NURSE NOTE - NSICDXPASTSURGICALHX_GEN_ALL_CORE_FT
PAST SURGICAL HISTORY:  Other acquired absence of organ 5 years ago    Other postprocedural status H/O bilateral breast reduction surgery

## 2025-03-18 NOTE — ED ADULT TRIAGE NOTE - CHIEF COMPLAINT QUOTE
Pt to ED c/o sob x6 days. Pt reports flying from [place] wed and needing o2 on arrival. On room air at this time. Hx of Asthma. Used inhaler x3 times today. Speaking in complete uninterrupted sentences. No past hx of intubation. Equal b/l chest rise. Endorses chest pain x2 days. No leg edema noted. EKG in progress.

## 2025-03-18 NOTE — ED PROVIDER NOTE - WR ORDER NAME 1
"Heather reports symptomx x 4 days.  Yesterday, started feeling worse and went to the ED.  They said her urine looked \"dirty\", and started Augmentin while awaiting culture results.  Initially recommended cipro, but Roxann had concerns about prolonged QT.    She is checking in here as we have treated her in the past.  She will start Augmentin as prescribed and call back if she has further concerns after culture results are back.  "
Xray Chest 1 View- PORTABLE-Urgent

## 2025-03-20 NOTE — ED POST DISCHARGE NOTE - RESULT SUMMARY
MD Feliz:  ID [id number]: spoke with pt on phone with . called to check on how pt is feeling. she states she feels somewhat improved from yesterday. obtained more comprehensive hpi: pt states that she started to have a sudden onset progressive HA that gradually got worse over time approx 1 w/a prior to syncopizing and falling. her  yesterday had said he found her having fallen in a different room so episode was unwitnessed. pt denies hx of syncope, does not see a cardiologist. states that she knows she has htn but was never prescribed meds. states she was on a plane over the past few days and had non-radiating cp and sob and was told her o2 was low but she doesn't have any cp or sob now or since then. reports that she had L knee surgery many years ago after which she had LLE numbness and weakness, then 2 y/a developed RLE numbness and weakness, states these sx have worsened over the past few weeks. reports the cp she felt on the plane did not radiating to her back and her chronic back pain was separate. also reports ble leg pain. states that she started having bue numbness in the past 1 week. denies recent injury or abdominal pain. based on speaking to pt I did recommend that she come to the ED as soon as possible for repeat evaluation, however pt states she can't return until next Wednesday. I told pt if she has cp, sob, or any new or worsening sx to return to ED immediately 24/7. told pt that numbness/weakness/urinary or fecal incontinence can be signs of cord compression and if sx worsen to return to ED immediately. told pt to f/u with spine and cardiology.

## 2025-03-21 DIAGNOSIS — I10 ESSENTIAL (PRIMARY) HYPERTENSION: ICD-10-CM

## 2025-03-21 DIAGNOSIS — R55 SYNCOPE AND COLLAPSE: ICD-10-CM
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
## 2025-03-21 DIAGNOSIS — R51.9 HEADACHE, UNSPECIFIED: ICD-10-CM

## 2025-03-21 DIAGNOSIS — Z91.013 ALLERGY TO SEAFOOD: ICD-10-CM
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
## 2025-03-21 DIAGNOSIS — J45.909 UNSPECIFIED ASTHMA, UNCOMPLICATED: ICD-10-CM
